# Patient Record
Sex: FEMALE | Race: BLACK OR AFRICAN AMERICAN | NOT HISPANIC OR LATINO | Employment: FULL TIME | URBAN - METROPOLITAN AREA
[De-identification: names, ages, dates, MRNs, and addresses within clinical notes are randomized per-mention and may not be internally consistent; named-entity substitution may affect disease eponyms.]

---

## 2020-11-27 ENCOUNTER — LAB (OUTPATIENT)
Dept: LAB | Age: 45
End: 2020-11-27

## 2020-11-27 ENCOUNTER — TRANSCRIBE ORDERS (OUTPATIENT)
Dept: ADMINISTRATIVE | Age: 45
End: 2020-11-27

## 2020-11-27 DIAGNOSIS — Z01.84 IMMUNITY STATUS TESTING: ICD-10-CM

## 2020-11-27 DIAGNOSIS — Z01.84 IMMUNITY STATUS TESTING: Primary | ICD-10-CM

## 2020-11-27 PROCEDURE — 86762 RUBELLA ANTIBODY: CPT

## 2020-11-27 PROCEDURE — 86787 VARICELLA-ZOSTER ANTIBODY: CPT

## 2020-11-27 PROCEDURE — 86765 RUBEOLA ANTIBODY: CPT

## 2020-11-27 PROCEDURE — 86735 MUMPS ANTIBODY: CPT

## 2020-11-27 PROCEDURE — 86480 TB TEST CELL IMMUN MEASURE: CPT

## 2020-11-28 LAB — RUBV IGG SERPL IA-ACNC: 52.2 IU/ML

## 2020-11-30 LAB
GAMMA INTERFERON BACKGROUND BLD IA-ACNC: 0.02 IU/ML
M TB IFN-G BLD-IMP: NEGATIVE
M TB IFN-G CD4+ BCKGRND COR BLD-ACNC: 0 IU/ML
M TB IFN-G CD4+ BCKGRND COR BLD-ACNC: 0 IU/ML
MEV IGG SER QL: ABNORMAL
MITOGEN IGNF BCKGRD COR BLD-ACNC: >10 IU/ML
MUV IGG SER QL: NORMAL
VZV IGG SER IA-ACNC: NORMAL

## 2021-01-06 ENCOUNTER — TRANSCRIBE ORDERS (OUTPATIENT)
Dept: LAB | Facility: HOSPITAL | Age: 46
End: 2021-01-06

## 2021-01-06 ENCOUNTER — LAB (OUTPATIENT)
Dept: LAB | Facility: HOSPITAL | Age: 46
End: 2021-01-06

## 2021-01-06 DIAGNOSIS — Z01.84 IMMUNITY STATUS TESTING: Primary | ICD-10-CM

## 2021-01-06 DIAGNOSIS — Z01.84 IMMUNITY STATUS TESTING: ICD-10-CM

## 2021-01-06 PROCEDURE — 36415 COLL VENOUS BLD VENIPUNCTURE: CPT

## 2021-01-06 PROCEDURE — 86765 RUBEOLA ANTIBODY: CPT

## 2021-01-07 LAB — MEV IGG SER QL: ABNORMAL

## 2021-08-25 ENCOUNTER — IMMUNIZATIONS (OUTPATIENT)
Dept: FAMILY MEDICINE CLINIC | Facility: MEDICAL CENTER | Age: 46
End: 2021-08-25

## 2021-08-25 DIAGNOSIS — Z23 ENCOUNTER FOR IMMUNIZATION: Primary | ICD-10-CM

## 2021-08-25 PROCEDURE — 91300 SARSCOV2 VAC 30MCG/0.3ML IM: CPT

## 2021-09-15 ENCOUNTER — IMMUNIZATIONS (OUTPATIENT)
Dept: FAMILY MEDICINE CLINIC | Facility: MEDICAL CENTER | Age: 46
End: 2021-09-15

## 2021-09-15 DIAGNOSIS — Z23 ENCOUNTER FOR IMMUNIZATION: Primary | ICD-10-CM

## 2021-09-15 PROCEDURE — 91300 SARSCOV2 VAC 30MCG/0.3ML IM: CPT

## 2022-01-13 ENCOUNTER — ANNUAL EXAM (OUTPATIENT)
Dept: OBGYN CLINIC | Facility: CLINIC | Age: 47
End: 2022-01-13
Payer: COMMERCIAL

## 2022-01-13 VITALS
BODY MASS INDEX: 38.72 KG/M2 | WEIGHT: 226.8 LBS | HEIGHT: 64 IN | DIASTOLIC BLOOD PRESSURE: 82 MMHG | SYSTOLIC BLOOD PRESSURE: 128 MMHG

## 2022-01-13 DIAGNOSIS — E04.1 THYROID NODULE: ICD-10-CM

## 2022-01-13 DIAGNOSIS — R92.8 ABNORMAL MAMMOGRAM: ICD-10-CM

## 2022-01-13 DIAGNOSIS — Z12.4 CERVICAL CANCER SCREENING: Primary | ICD-10-CM

## 2022-01-13 DIAGNOSIS — N81.4 CERVICAL PROLAPSE: ICD-10-CM

## 2022-01-13 DIAGNOSIS — Z12.31 ENCOUNTER FOR SCREENING MAMMOGRAM FOR BREAST CANCER: ICD-10-CM

## 2022-01-13 DIAGNOSIS — Z11.51 SCREENING FOR HPV (HUMAN PAPILLOMAVIRUS): ICD-10-CM

## 2022-01-13 DIAGNOSIS — Z01.419 ENCOUNTER FOR ANNUAL ROUTINE GYNECOLOGICAL EXAMINATION: ICD-10-CM

## 2022-01-13 PROCEDURE — G0476 HPV COMBO ASSAY CA SCREEN: HCPCS | Performed by: OBSTETRICS & GYNECOLOGY

## 2022-01-13 PROCEDURE — G0143 SCR C/V CYTO,THINLAYER,RESCR: HCPCS | Performed by: OBSTETRICS & GYNECOLOGY

## 2022-01-13 PROCEDURE — 99386 PREV VISIT NEW AGE 40-64: CPT | Performed by: OBSTETRICS & GYNECOLOGY

## 2022-01-13 NOTE — PROGRESS NOTES
Assessment/Plan:    Pap and HPV done today      I reviewed her most recent mammogram report  She was due to have a right diagnostic mammogram and ultrasound in 2020  I ordered a bilateral diagnostic mammogram and a right ultrasound  I explained that she will have to obtain her old records so that the radiologist can compare her new study to the old once  She understands this  Discussed self breast exams    colon cancer screening  -Reviewed the new guidelines    Thyroid nodule - this is a very movable, nontender nodule, it is directly over the right side of her thyroid but does not feel attached to her thyroid  I ordered a thyroid ultrasound and a TSH  She is aware she may need further follow-up  I recommended that she look into establishing herself with a new family doctor  Cervical prolapse- this is mild and asymptomatic, she is doing Kegel's and I recommended that she continue with this  She will call if symptoms change  discussed preventive care, regular exercise and a healthy diet      No problem-specific Assessment & Plan notes found for this encounter  Diagnoses and all orders for this visit:    Cervical cancer screening  -     Liquid-based pap, screening    Encounter for annual routine gynecological examination  -     Liquid-based pap, screening    Encounter for screening mammogram for breast cancer  -     Cancel: Mammo screening bilateral w 3d & cad; Future    Abnormal mammogram  -     Mammo diagnostic bilateral w 3d & cad; Future  -     US breast right limited (diagnostic); Future    Thyroid nodule  -     US thyroid; Future  -     TSH, 3rd generation with Free T4 reflex; Future    Screening for HPV (human papillomavirus)  -     Liquid-based pap, screening          Subjective:      Patient ID: Uzair Ortega is a 55 y o  female  New patient- 58-year-old female presents for yearly  Her last exam was about 1 year ago  She moved to the area in December 2020   She is concerned because she is overdue for a mammogram   Her last mammogram was in December 2019 and she was due to return in 6 months for a right breast US  And diagnostic mammogram   There was also a left breast asymmetry that resolved  She is overdue for this  She has dense tissue  she has had regular gyn care, no history of abnormal Paps  She is status post supracervical hysterectomy for very large fibroids  Tubes and ovaries were retained  Recently, she was told she had a cervical prolapse although this is not bothersome to her  The following portions of the patient's history were reviewed and updated as appropriate: allergies, current medications, past family history, past medical history, past social history, past surgical history and problem list     Review of Systems   Constitutional: Negative  Gastrointestinal: Negative  Genitourinary: Negative  Objective:      /82 (BP Location: Left arm, Patient Position: Sitting, Cuff Size: Standard)   Ht 5' 4" (1 626 m)   Wt 103 kg (226 lb 12 8 oz)   BMI 38 93 kg/m²          Physical Exam  Vitals reviewed  Constitutional:       Appearance: She is well-developed  Neck:      Thyroid: Thyroid mass present  No thyromegaly  Trachea: No tracheal deviation  Comments:  Very movable mass over the right side of her thyroid, nontender, approximately 1 5-2 centimeters  Cardiovascular:      Rate and Rhythm: Normal rate and regular rhythm  Pulmonary:      Effort: Pulmonary effort is normal       Breath sounds: Normal breath sounds  Chest:   Breasts: Breasts are symmetrical       Right: No inverted nipple, mass, nipple discharge, skin change or tenderness  Left: No inverted nipple, mass, nipple discharge, skin change or tenderness  Abdominal:      General: There is no distension  Palpations: Abdomen is soft  There is no mass  Tenderness: There is no abdominal tenderness     Genitourinary:     Labia:         Right: No rash, tenderness, lesion or injury  Left: No rash, tenderness, lesion or injury  Vagina: Normal       Cervix: No cervical motion tenderness, discharge or friability  Uterus: Absent  Adnexa:         Right: No mass, tenderness or fullness  Left: No mass, tenderness or fullness          Rectum: Normal       Comments:   Cervix normal, some cervical prolapse, uterus is surgically absent

## 2022-01-15 LAB
HPV HR 12 DNA CVX QL NAA+PROBE: NEGATIVE
HPV16 DNA CVX QL NAA+PROBE: NEGATIVE
HPV18 DNA CVX QL NAA+PROBE: NEGATIVE

## 2022-01-17 ENCOUNTER — HOSPITAL ENCOUNTER (OUTPATIENT)
Dept: ULTRASOUND IMAGING | Facility: CLINIC | Age: 47
Discharge: HOME | End: 2022-01-17
Payer: COMMERCIAL

## 2022-01-17 ENCOUNTER — HOSPITAL ENCOUNTER (OUTPATIENT)
Dept: MAMMOGRAPHY | Facility: CLINIC | Age: 47
Discharge: HOME | End: 2022-01-17
Payer: COMMERCIAL

## 2022-01-17 VITALS — HEIGHT: 64 IN | BODY MASS INDEX: 38.58 KG/M2 | WEIGHT: 226 LBS

## 2022-01-17 DIAGNOSIS — R92.8 ABNORMAL MAMMOGRAM: ICD-10-CM

## 2022-01-17 PROCEDURE — 76642 ULTRASOUND BREAST LIMITED: CPT

## 2022-01-17 PROCEDURE — 77066 DX MAMMO INCL CAD BI: CPT

## 2022-01-17 PROCEDURE — G0279 TOMOSYNTHESIS, MAMMO: HCPCS

## 2022-01-18 LAB
LAB AP GYN PRIMARY INTERPRETATION: NORMAL
Lab: NORMAL

## 2022-01-22 ENCOUNTER — HOSPITAL ENCOUNTER (OUTPATIENT)
Dept: ULTRASOUND IMAGING | Facility: HOSPITAL | Age: 47
Discharge: HOME | End: 2022-01-22
Payer: COMMERCIAL

## 2022-01-22 DIAGNOSIS — E04.1 THYROID NODULE: ICD-10-CM

## 2022-01-22 PROCEDURE — 76536 US EXAM OF HEAD AND NECK: CPT

## 2022-01-27 ENCOUNTER — TELEPHONE (OUTPATIENT)
Dept: MAMMOGRAPHY | Facility: CLINIC | Age: 47
End: 2022-01-27

## 2022-02-10 NOTE — NURSING NOTE
Call placed to patient to discuss upcoming appointment at [facility] radiology department and complete consultation with patient  Patient is having thyroid biopsy  utilizing US guidance  Reviewed patient's allergies, no current anticoagulant medication present, also discussed the pre and post procedure expectations  Reminded patient of location and time expected for procedure, Patient expressed understanding by verbalizing and repeating instructions

## 2022-02-22 ENCOUNTER — HOSPITAL ENCOUNTER (OUTPATIENT)
Dept: RADIOLOGY | Facility: HOSPITAL | Age: 47
Discharge: HOME | End: 2022-02-22
Attending: OTOLARYNGOLOGY
Payer: COMMERCIAL

## 2022-02-22 DIAGNOSIS — E04.2 MULTIPLE THYROID NODULES: ICD-10-CM

## 2022-02-22 PROCEDURE — 88173 CYTOPATH EVAL FNA REPORT: CPT | Performed by: PATHOLOGY

## 2022-02-22 PROCEDURE — 10006 FNA BX W/US GDN EA ADDL: CPT

## 2022-02-22 PROCEDURE — 10005 FNA BX W/US GDN 1ST LES: CPT

## 2022-02-22 RX ORDER — LIDOCAINE HYDROCHLORIDE 10 MG/ML
3 INJECTION, SOLUTION EPIDURAL; INFILTRATION; INTRACAUDAL; PERINEURAL ONCE
Status: COMPLETED | OUTPATIENT
Start: 2022-02-22 | End: 2022-02-22

## 2022-02-22 RX ADMIN — LIDOCAINE HYDROCHLORIDE 3 ML: 10 INJECTION, SOLUTION EPIDURAL; INFILTRATION; INTRACAUDAL; PERINEURAL at 11:15

## 2022-07-14 ENCOUNTER — APPOINTMENT (OUTPATIENT)
Dept: LAB | Facility: HOSPITAL | Age: 47
End: 2022-07-14

## 2022-07-14 DIAGNOSIS — Z00.8 HEALTH EXAMINATION IN POPULATION SURVEY: ICD-10-CM

## 2022-07-14 LAB
CHOLEST SERPL-MCNC: 180 MG/DL
EST. AVERAGE GLUCOSE BLD GHB EST-MCNC: 120 MG/DL
HBA1C MFR BLD: 5.8 %
HDLC SERPL-MCNC: 46 MG/DL
LDLC SERPL CALC-MCNC: 124 MG/DL (ref 0–100)
NONHDLC SERPL-MCNC: 134 MG/DL
TRIGL SERPL-MCNC: 48 MG/DL

## 2022-07-14 PROCEDURE — 36415 COLL VENOUS BLD VENIPUNCTURE: CPT

## 2022-07-14 PROCEDURE — 80061 LIPID PANEL: CPT

## 2022-07-14 PROCEDURE — 83036 HEMOGLOBIN GLYCOSYLATED A1C: CPT

## 2022-07-19 ENCOUNTER — HOSPITAL ENCOUNTER (EMERGENCY)
Facility: HOSPITAL | Age: 47
Discharge: HOME | End: 2022-07-19
Attending: EMERGENCY MEDICINE
Payer: OTHER MISCELLANEOUS

## 2022-07-19 VITALS
SYSTOLIC BLOOD PRESSURE: 137 MMHG | HEART RATE: 92 BPM | DIASTOLIC BLOOD PRESSURE: 78 MMHG | RESPIRATION RATE: 16 BRPM | TEMPERATURE: 98.1 F | OXYGEN SATURATION: 99 %

## 2022-07-19 DIAGNOSIS — M54.30 SCIATIC LEG PAIN: Primary | ICD-10-CM

## 2022-07-19 PROCEDURE — 99283 EMERGENCY DEPT VISIT LOW MDM: CPT

## 2022-07-19 PROCEDURE — 99284 EMERGENCY DEPT VISIT MOD MDM: CPT | Performed by: EMERGENCY MEDICINE

## 2022-07-19 RX ORDER — CYCLOBENZAPRINE HCL 10 MG
10 TABLET ORAL 2 TIMES DAILY PRN
Qty: 20 TABLET | Refills: 0 | Status: SHIPPED | OUTPATIENT
Start: 2022-07-19 | End: 2022-07-19 | Stop reason: CLARIF

## 2022-07-19 RX ORDER — LIDOCAINE 50 MG/G
1 PATCH TOPICAL DAILY
Qty: 5 PATCH | Refills: 0 | Status: SHIPPED | OUTPATIENT
Start: 2022-07-19 | End: 2022-07-24

## 2022-07-19 RX ORDER — PREDNISONE 20 MG/1
TABLET ORAL
Qty: 5 TABLET | Refills: 0 | Status: SHIPPED | OUTPATIENT
Start: 2022-07-19 | End: 2022-07-23

## 2022-07-19 RX ORDER — DIAZEPAM 5 MG/1
5 TABLET ORAL ONCE
Status: DISCONTINUED | OUTPATIENT
Start: 2022-07-19 | End: 2022-07-19 | Stop reason: HOSPADM

## 2022-07-19 RX ORDER — PREDNISONE 20 MG/1
40 TABLET ORAL ONCE
Status: COMPLETED | OUTPATIENT
Start: 2022-07-19 | End: 2022-07-19

## 2022-07-19 RX ORDER — LIDOCAINE 50 MG/G
1 PATCH TOPICAL ONCE
Status: DISCONTINUED | OUTPATIENT
Start: 2022-07-19 | End: 2022-07-19 | Stop reason: HOSPADM

## 2022-07-19 RX ORDER — DIAZEPAM 5 MG/1
5 TABLET ORAL EVERY 8 HOURS PRN
Qty: 15 TABLET | Refills: 0 | Status: SHIPPED | OUTPATIENT
Start: 2022-07-19

## 2022-07-19 RX ADMIN — PREDNISONE 40 MG: 20 TABLET ORAL at 10:31

## 2022-07-19 RX ADMIN — LIDOCAINE 5% 1 PATCH: 700 PATCH TOPICAL at 10:31

## 2022-07-19 NOTE — Clinical Note
Daniella Martinez was seen and treated in our emergency department on 7/19/2022  Diagnosis:     Dilia Hernández    She may return on this date: 07/21/2022         If you have any questions or concerns, please don't hesitate to call        Mireya Rajput DO    ______________________________           _______________          _______________  Hospital Representative                              Date                                Time

## 2022-07-19 NOTE — ED PROVIDER NOTES
History  Chief Complaint   Patient presents with    Back Pain     While at work, bent over to pick something up and felt pain in lower back going down right leg  Hx of sciatica      Patient is a 42-year-old female presented to the emergency department for chief complaint of lower back pain  Patient states that on Sunday of this week she bent over to pick something up and felt a pain in her lower back on the left side  Patient states that since she has been treating symptomatically with over-the-counter medications without any alleviation of her symptoms  Patient states that today she tweaked it again and has been getting a shooting pain down the back of her left buttocks into her thigh  Patient states it feels exactly like her sciatic pain that she has had prior 1 to come to the hospital for further evaluation  Patient denies any fevers, chills, chest pain, shortness breath, nausea, vomiting, diarrhea, constipation, abdominal pain, dysuria, hematuria, urinary incontinence, saddle anesthesias, difficulty ambulating  None       Past Medical History:   Diagnosis Date    Fibroid     Varicella        Past Surgical History:   Procedure Laterality Date    CARDIAC ELECTROPHYSIOLOGY STUDY AND ABLATION      7/2015    PARTIAL HYSTERECTOMY      2/2012    US GUIDED THYROID BIOPSY  2/22/2022       Family History   Problem Relation Age of Onset    No Known Problems Mother     Heart disease Father     Diabetes Father     Hyperlipidemia Father     Hypertension Father     No Known Problems Sister     No Known Problems Daughter     No Known Problems Maternal Grandmother     No Known Problems Maternal Grandfather     No Known Problems Paternal Grandmother     No Known Problems Paternal Grandfather     No Known Problems Maternal Aunt     No Known Problems Paternal Aunt     Stomach cancer Paternal Aunt      I have reviewed and agree with the history as documented      E-Cigarette/Vaping    E-Cigarette Use Never User      E-Cigarette/Vaping Substances    Nicotine No     THC No     CBD No     Flavoring No      Social History     Tobacco Use    Smoking status: Never Smoker    Smokeless tobacco: Never Used   Vaping Use    Vaping Use: Never used   Substance Use Topics    Alcohol use: Yes     Comment: social    Drug use: Never        Review of Systems   Constitutional: Positive for activity change  Negative for chills and fever  HENT: Negative for congestion, ear pain and sore throat  Eyes: Negative for pain and visual disturbance  Respiratory: Negative for cough, chest tightness and shortness of breath  Cardiovascular: Negative for chest pain and palpitations  Gastrointestinal: Negative for abdominal pain, constipation, diarrhea, nausea and vomiting  Genitourinary: Negative for decreased urine volume, difficulty urinating, dysuria and hematuria  Musculoskeletal: Positive for back pain  Negative for arthralgias, neck pain and neck stiffness  Skin: Negative for color change and rash  Neurological: Negative for dizziness, seizures, syncope, weakness, light-headedness, numbness and headaches  Psychiatric/Behavioral: Negative for agitation and behavioral problems  All other systems reviewed and are negative  Physical Exam  ED Triage Vitals [07/19/22 1026]   Temperature Pulse Respirations Blood Pressure SpO2   98 1 °F (36 7 °C) 92 16 137/78 99 %      Temp Source Heart Rate Source Patient Position - Orthostatic VS BP Location FiO2 (%)   Oral Monitor Sitting Right arm --      Pain Score       8             Orthostatic Vital Signs  Vitals:    07/19/22 1026   BP: 137/78   Pulse: 92   Patient Position - Orthostatic VS: Sitting       Physical Exam  Vitals and nursing note reviewed  Constitutional:       General: She is not in acute distress  Appearance: Normal appearance  She is well-developed and normal weight  HENT:      Head: Normocephalic and atraumatic        Right Ear: External ear normal       Left Ear: External ear normal       Nose: Nose normal       Mouth/Throat:      Mouth: Mucous membranes are moist    Eyes:      Extraocular Movements: Extraocular movements intact  Conjunctiva/sclera: Conjunctivae normal       Pupils: Pupils are equal, round, and reactive to light  Cardiovascular:      Rate and Rhythm: Normal rate and regular rhythm  Heart sounds: No murmur heard  Pulmonary:      Effort: Pulmonary effort is normal  No respiratory distress  Breath sounds: Normal breath sounds  Abdominal:      General: Abdomen is flat  Palpations: Abdomen is soft  Tenderness: There is no abdominal tenderness  There is no guarding or rebound  Musculoskeletal:      Cervical back: Normal, normal range of motion and neck supple  Thoracic back: Normal       Lumbar back: No bony tenderness  Positive left straight leg raise test       Right hip: Normal       Left hip: Normal       Right upper leg: Normal       Left upper leg: Normal       Comments: Paraspinal hypertonicity on the left side of the lumbar spine   Skin:     General: Skin is warm and dry  Capillary Refill: Capillary refill takes less than 2 seconds  Neurological:      General: No focal deficit present  Mental Status: She is alert and oriented to person, place, and time     Psychiatric:         Mood and Affect: Mood normal          Behavior: Behavior normal          ED Medications  Medications   diazepam (VALIUM) tablet 5 mg (5 mg Oral Not Given 7/19/22 1034)   lidocaine (LIDODERM) 5 % patch 1 patch (1 patch Topical Medication Applied 7/19/22 1031)   predniSONE tablet 40 mg (40 mg Oral Given 7/19/22 1031)       Diagnostic Studies  Results Reviewed     None                 No orders to display         Procedures  Procedures      ED Course  ED Course as of 07/19/22 1045   Tue Jul 19, 2022   1031 Blood Pressure: 137/78   1031 Temperature: 98 1 °F (36 7 °C)   1031 Temp Source: Oral   1031 Pulse: 92 1031 Respirations: 16   1031 SpO2: 99 %   1038 Patient is 78-year-old female presenting to the emergency department for evaluation of sciatic pain  Will treat patient's symptoms with steroids, Valium as well as Lidoderm patches  Advised patient to follow-up with her primary care doctor in a few days for re-evaluation  Advised her to come back to the hospital if she develops any new, worsening or concerning symptoms  Advised her to follow-up with comprehensive spine for further evaluation  Patient understands has no questions or concerns at this time stable for discharge  SBIRT 22yo+    Flowsheet Row Most Recent Value   SBIRT (25 yo +)    In order to provide better care to our patients, we are screening all of our patients for alcohol and drug use  Would it be okay to ask you these screening questions? Unable to answer at this time Filed at: 07/19/2022 1028                MDM    Disposition  Final diagnoses:   Sciatic leg pain     Time reflects when diagnosis was documented in both MDM as applicable and the Disposition within this note     Time User Action Codes Description Comment    7/19/2022 10:24 AM Cruz Briones Add [M54 30] Sciatic leg pain       ED Disposition     ED Disposition   Discharge    Condition   Stable    Date/Time   Tue Jul 19, 2022 10:34 AM    Comment   Gloria Mercer discharge to home/self care                 Follow-up Information     Follow up With Specialties Details Why Contact Info Additional [address] [facility] Emergency Medicine Go to  As needed, If symptoms worsen [address] [phone]  [address] Emergency Department, [address] [place], [place], [address] [place] [address] Physical Therapy Schedule an appointment as soon as possible for a visit  for follow up 11 [phone] [facility] Family Medicine Schedule an appointment as soon as possible for a visit  for follow up [address], [address] [facility] [phone]  [address], [address], [address] [place], [place], [address]          Patient's Medications   Discharge Prescriptions    DIAZEPAM (VALIUM) 5 MG TABLET    Take 1 tablet (5 mg total) by mouth every 8 (eight) hours as needed for muscle spasms for up to 15 doses       Start Date: 7/19/2022 End Date: --       Order Dose: 5 mg       Quantity: 15 tablet    Refills: 0    LIDOCAINE (LIDODERM) 5 %    Apply 1 patch topically daily for 5 days Remove & Discard patch within 12 hours or as directed by MD       Start Date: 7/19/2022 End Date: 7/24/2022       Order Dose: 1 patch       Quantity: 5 patch    Refills: 0    PREDNISONE 20 MG TABLET    Take 2 tablets (40 mg total) by mouth daily for 1 day, THEN 1 5 tablets (30 mg total) daily for 1 day, THEN 1 tablet (20 mg total) daily for 1 day, THEN 0 5 tablets (10 mg total) daily for 1 day  Start Date: 7/19/2022 End Date: 7/23/2022       Order Dose: --       Quantity: 5 tablet    Refills: 0     No discharge procedures on file  PDMP Review     None           ED Provider  Attending physically available and evaluated Jaren Harvey I managed the patient along with the ED Attending      Electronically Signed by         Shanon Hickman DO  07/19/22 5234

## 2022-08-06 NOTE — ED ATTENDING ATTESTATION
7/19/2022  Ave URIAS MD, saw and evaluated the patient  I have discussed the patient with the resident/non-physician practitioner and agree with the resident's/non-physician practitioner's findings, Plan of Care, and MDM as documented in the resident's/non-physician practitioner's note, except where noted  All available labs and Radiology studies were reviewed  I was present for key portions of any procedure(s) performed by the resident/non-physician practitioner and I was immediately available to provide assistance  At this point I agree with the current assessment done in the Emergency Department  I have conducted an independent evaluation of this patient a history and physical is as follows:    ED Course     Patient presents for evaluation due to left lower back pain radiating down her left leg that started on Sunday when she bent over to pick something up  Patient states that today the pain was worse  Patient has had sciatic pain and 1 time previously  No improvement with over-the-counter medications  She denies any numbness, weakness, loss of bowel/bladder control, or fevers  No additional complaints  Exam: AAOx3, moderate distress due to pain, reproduction of symptoms with palpation in left piriformis, no motor/sensory deficits  A/P:  Sciatica  Will treat with steroids, Lidoderm, and muscle relaxer      Critical Care Time  Procedures

## 2022-09-01 ENCOUNTER — APPOINTMENT (OUTPATIENT)
Dept: RADIOLOGY | Facility: HOSPITAL | Age: 47
End: 2022-09-01
Payer: COMMERCIAL

## 2022-09-01 ENCOUNTER — HOSPITAL ENCOUNTER (EMERGENCY)
Facility: HOSPITAL | Age: 47
Discharge: HOME | End: 2022-09-01
Attending: EMERGENCY MEDICINE
Payer: COMMERCIAL

## 2022-09-01 VITALS
HEIGHT: 64 IN | HEART RATE: 89 BPM | DIASTOLIC BLOOD PRESSURE: 96 MMHG | OXYGEN SATURATION: 98 % | RESPIRATION RATE: 18 BRPM | TEMPERATURE: 98.3 F | BODY MASS INDEX: 38.58 KG/M2 | SYSTOLIC BLOOD PRESSURE: 165 MMHG | WEIGHT: 226 LBS

## 2022-09-01 DIAGNOSIS — K52.9 GASTROENTERITIS: Primary | ICD-10-CM

## 2022-09-01 LAB
ALBUMIN SERPL BCP-MCNC: 3.8 G/DL (ref 3.5–5)
ALP SERPL-CCNC: 73 U/L (ref 46–116)
ALT SERPL W P-5'-P-CCNC: 24 U/L (ref 12–78)
ANION GAP SERPL CALCULATED.3IONS-SCNC: 9 MMOL/L (ref 4–13)
AST SERPL W P-5'-P-CCNC: 12 U/L (ref 5–45)
ATRIAL RATE: 81 BPM
BASOPHILS # BLD AUTO: 0.03 THOUSANDS/ΜL (ref 0–0.1)
BASOPHILS NFR BLD AUTO: 0 % (ref 0–1)
BILIRUB SERPL-MCNC: 0.5 MG/DL (ref 0.2–1)
BUN SERPL-MCNC: 10 MG/DL (ref 5–25)
CALCIUM SERPL-MCNC: 8.7 MG/DL (ref 8.3–10.1)
CARDIAC TROPONIN I PNL SERPL HS: 3 NG/L
CHLORIDE SERPL-SCNC: 106 MMOL/L (ref 96–108)
CO2 SERPL-SCNC: 26 MMOL/L (ref 21–32)
CREAT SERPL-MCNC: 0.92 MG/DL (ref 0.6–1.3)
EOSINOPHIL # BLD AUTO: 0.06 THOUSAND/ΜL (ref 0–0.61)
EOSINOPHIL NFR BLD AUTO: 1 % (ref 0–6)
ERYTHROCYTE [DISTWIDTH] IN BLOOD BY AUTOMATED COUNT: 14.6 % (ref 11.6–15.1)
GFR SERPL CREATININE-BSD FRML MDRD: 74 ML/MIN/1.73SQ M
GLUCOSE SERPL-MCNC: 117 MG/DL (ref 65–140)
HCT VFR BLD AUTO: 42.3 % (ref 34.8–46.1)
HGB BLD-MCNC: 13.4 G/DL (ref 11.5–15.4)
IMM GRANULOCYTES # BLD AUTO: 0.02 THOUSAND/UL (ref 0–0.2)
IMM GRANULOCYTES NFR BLD AUTO: 0 % (ref 0–2)
LIPASE SERPL-CCNC: 49 U/L (ref 73–393)
LYMPHOCYTES # BLD AUTO: 1.35 THOUSANDS/ΜL (ref 0.6–4.47)
LYMPHOCYTES NFR BLD AUTO: 15 % (ref 14–44)
MCH RBC QN AUTO: 26.3 PG (ref 26.8–34.3)
MCHC RBC AUTO-ENTMCNC: 31.7 G/DL (ref 31.4–37.4)
MCV RBC AUTO: 83 FL (ref 82–98)
MONOCYTES # BLD AUTO: 0.27 THOUSAND/ΜL (ref 0.17–1.22)
MONOCYTES NFR BLD AUTO: 3 % (ref 4–12)
NEUTROPHILS # BLD AUTO: 7.36 THOUSANDS/ΜL (ref 1.85–7.62)
NEUTS SEG NFR BLD AUTO: 81 % (ref 43–75)
NRBC BLD AUTO-RTO: 0 /100 WBCS
P AXIS: 58 DEGREES
PLATELET # BLD AUTO: 299 THOUSANDS/UL (ref 149–390)
PMV BLD AUTO: 9 FL (ref 8.9–12.7)
POTASSIUM SERPL-SCNC: 3.6 MMOL/L (ref 3.5–5.3)
PR INTERVAL: 136 MS
PROT SERPL-MCNC: 7.6 G/DL (ref 6.4–8.4)
QRS AXIS: 24 DEGREES
QRSD INTERVAL: 76 MS
QT INTERVAL: 386 MS
QTC INTERVAL: 448 MS
RBC # BLD AUTO: 5.09 MILLION/UL (ref 3.81–5.12)
SODIUM SERPL-SCNC: 141 MMOL/L (ref 135–147)
T WAVE AXIS: 19 DEGREES
VENTRICULAR RATE: 81 BPM
WBC # BLD AUTO: 9.09 THOUSAND/UL (ref 4.31–10.16)

## 2022-09-01 PROCEDURE — 36415 COLL VENOUS BLD VENIPUNCTURE: CPT

## 2022-09-01 PROCEDURE — 85025 COMPLETE CBC W/AUTO DIFF WBC: CPT | Performed by: EMERGENCY MEDICINE

## 2022-09-01 PROCEDURE — 99285 EMERGENCY DEPT VISIT HI MDM: CPT | Performed by: PHYSICIAN ASSISTANT

## 2022-09-01 PROCEDURE — 93010 ELECTROCARDIOGRAM REPORT: CPT | Performed by: INTERNAL MEDICINE

## 2022-09-01 PROCEDURE — 71046 X-RAY EXAM CHEST 2 VIEWS: CPT

## 2022-09-01 PROCEDURE — 99284 EMERGENCY DEPT VISIT MOD MDM: CPT

## 2022-09-01 PROCEDURE — 83690 ASSAY OF LIPASE: CPT | Performed by: PHYSICIAN ASSISTANT

## 2022-09-01 PROCEDURE — 80053 COMPREHEN METABOLIC PANEL: CPT | Performed by: EMERGENCY MEDICINE

## 2022-09-01 PROCEDURE — 84484 ASSAY OF TROPONIN QUANT: CPT | Performed by: EMERGENCY MEDICINE

## 2022-09-01 PROCEDURE — 96361 HYDRATE IV INFUSION ADD-ON: CPT

## 2022-09-01 PROCEDURE — 96374 THER/PROPH/DIAG INJ IV PUSH: CPT

## 2022-09-01 PROCEDURE — 93005 ELECTROCARDIOGRAM TRACING: CPT

## 2022-09-01 RX ORDER — MAGNESIUM HYDROXIDE/ALUMINUM HYDROXICE/SIMETHICONE 120; 1200; 1200 MG/30ML; MG/30ML; MG/30ML
30 SUSPENSION ORAL ONCE
Status: COMPLETED | OUTPATIENT
Start: 2022-09-01 | End: 2022-09-01

## 2022-09-01 RX ORDER — ONDANSETRON 2 MG/ML
4 INJECTION INTRAMUSCULAR; INTRAVENOUS ONCE
Status: COMPLETED | OUTPATIENT
Start: 2022-09-01 | End: 2022-09-01

## 2022-09-01 RX ORDER — LIDOCAINE HYDROCHLORIDE 20 MG/ML
15 SOLUTION OROPHARYNGEAL ONCE
Status: COMPLETED | OUTPATIENT
Start: 2022-09-01 | End: 2022-09-01

## 2022-09-01 RX ADMIN — ALUMINUM HYDROXIDE, MAGNESIUM HYDROXIDE, AND SIMETHICONE 30 ML: 200; 200; 20 SUSPENSION ORAL at 12:55

## 2022-09-01 RX ADMIN — ONDANSETRON 4 MG: 2 INJECTION INTRAMUSCULAR; INTRAVENOUS at 11:11

## 2022-09-01 RX ADMIN — LIDOCAINE HYDROCHLORIDE 15 ML: 20 SOLUTION ORAL; TOPICAL at 12:55

## 2022-09-01 RX ADMIN — SODIUM CHLORIDE 1000 ML: 0.9 INJECTION, SOLUTION INTRAVENOUS at 11:18

## 2022-09-01 NOTE — Clinical Note
Melinda Fifi was seen and treated in our emergency department on 9/1/2022  Diagnosis:     Jamaal Asher  is off the rest of the shift today  She may return on this date: 09/05/2022         If you have any questions or concerns, please don't hesitate to call        Bartolo Pascual PA-C    ______________________________           _______________          _______________  Hospital Representative                              Date                                Time

## 2022-09-01 NOTE — ED PROVIDER NOTES
History  Chief Complaint   Patient presents with    Dizziness     Pt to er with complaints of dizziness associated with vomiting that started around 830pm after dinner  Vomited again this am and upon arrival to ER      42-year-old female no significant past medical history, some anxiety, past surgical history of hysterectomy presents after having diarrhea and vomiting associated with some lightheadedness starting last made proximally dinner time  She reports she vomited approximately 10 times and diarrhea maybe half as much  No bloody stools or mucoid stools  No fevers  No exquisite abdominal pain but states she is having waves of pain that come and go in her epigastrium  Prior to Admission Medications   Prescriptions Last Dose Informant Patient Reported?  Taking?   diazepam (VALIUM) 5 mg tablet   No No   Sig: Take 1 tablet (5 mg total) by mouth every 8 (eight) hours as needed for muscle spasms for up to 15 doses   lidocaine (Lidoderm) 5 %   No No   Sig: Apply 1 patch topically daily for 5 days Remove & Discard patch within 12 hours or as directed by MD      Facility-Administered Medications: None       Past Medical History:   Diagnosis Date    Fibroid     Varicella        Past Surgical History:   Procedure Laterality Date    CARDIAC ELECTROPHYSIOLOGY STUDY AND ABLATION      7/2015    PARTIAL HYSTERECTOMY      2/2012    US GUIDED THYROID BIOPSY  2/22/2022       Family History   Problem Relation Age of Onset    No Known Problems Mother     Heart disease Father     Diabetes Father     Hyperlipidemia Father     Hypertension Father     No Known Problems Sister     No Known Problems Daughter     No Known Problems Maternal Grandmother     No Known Problems Maternal Grandfather     No Known Problems Paternal Grandmother     No Known Problems Paternal Grandfather     No Known Problems Maternal Aunt     No Known Problems Paternal Aunt     Stomach cancer Paternal Aunt      I have reviewed and agree with the history as documented  E-Cigarette/Vaping    E-Cigarette Use Never User      E-Cigarette/Vaping Substances    Nicotine No     THC No     CBD No     Flavoring No      Social History     Tobacco Use    Smoking status: Never Smoker    Smokeless tobacco: Never Used   Vaping Use    Vaping Use: Never used   Substance Use Topics    Alcohol use: Yes     Comment: social    Drug use: Never       Review of Systems   Constitutional: Negative for chills and fever  HENT: Negative for ear pain and sore throat  Eyes: Negative for pain and visual disturbance  Respiratory: Negative for cough and shortness of breath  Cardiovascular: Negative for chest pain and palpitations  Gastrointestinal: Positive for abdominal pain (Epigastric), nausea and vomiting  Genitourinary: Negative for dysuria and hematuria  Musculoskeletal: Negative for arthralgias and back pain  Skin: Negative for color change and rash  Neurological: Negative for seizures and syncope  All other systems reviewed and are negative  Physical Exam  Physical Exam  Vitals and nursing note reviewed  Constitutional:       General: She is not in acute distress  Appearance: She is well-developed  HENT:      Head: Normocephalic and atraumatic  Eyes:      Conjunctiva/sclera: Conjunctivae normal    Cardiovascular:      Rate and Rhythm: Normal rate and regular rhythm  Heart sounds: No murmur heard  Pulmonary:      Effort: Pulmonary effort is normal  No respiratory distress  Breath sounds: Normal breath sounds  Abdominal:      General: There is no distension  Palpations: Abdomen is soft  Tenderness: There is abdominal tenderness in the epigastric area  There is no right CVA tenderness, left CVA tenderness, guarding or rebound  Negative signs include Elam's sign, Rovsing's sign, McBurney's sign, psoas sign and obturator sign        Comments: Very mild epigastric tenderness / subjective reproduction palpation   Musculoskeletal:      Cervical back: Neck supple  Skin:     General: Skin is warm and dry  Neurological:      Mental Status: She is alert           Vital Signs  ED Triage Vitals   Temperature Pulse Respirations Blood Pressure SpO2   09/01/22 1041 09/01/22 1039 09/01/22 1039 09/01/22 1039 09/01/22 1039   98 3 °F (36 8 °C) 89 18 165/96 98 %      Temp Source Heart Rate Source Patient Position - Orthostatic VS BP Location FiO2 (%)   09/01/22 1041 09/01/22 1039 09/01/22 1039 09/01/22 1039 --   Oral Monitor Sitting Left arm       Pain Score       --                  Vitals:    09/01/22 1039   BP: 165/96   Pulse: 89   Patient Position - Orthostatic VS: Sitting         Visual Acuity      ED Medications  Medications   sodium chloride 0 9 % bolus 1,000 mL (0 mL Intravenous Stopped 9/1/22 1253)   ondansetron (ZOFRAN) injection 4 mg (4 mg Intravenous Given 9/1/22 1111)   aluminum-magnesium hydroxide-simethicone (MYLANTA) oral suspension 30 mL (30 mL Oral Given 9/1/22 1255)   Lidocaine Viscous HCl (XYLOCAINE) 2 % mucosal solution 15 mL (15 mL Swish & Swallow Given 9/1/22 1255)       Diagnostic Studies  Results Reviewed     Procedure Component Value Units Date/Time    Lipase [[id number]]  (Abnormal) Collected: 09/01/22 1047    Lab Status: Final result Specimen: Blood from Arm, Left Updated: 09/01/22 1227     Lipase 49 u/L     HS Troponin 0hr (reflex protocol) [[id number]]  (Normal) Collected: 09/01/22 1047    Lab Status: Final result Specimen: Blood from Arm, Left Updated: 09/01/22 1124     hs TnI 0hr 3 ng/L     Comprehensive metabolic panel [[id number]] Collected: 09/01/22 1047    Lab Status: Final result Specimen: Blood from Arm, Left Updated: 09/01/22 1116     Sodium 141 mmol/L      Potassium 3 6 mmol/L      Chloride 106 mmol/L      CO2 26 mmol/L      ANION GAP 9 mmol/L      BUN 10 mg/dL      Creatinine 0 92 mg/dL      Glucose 117 mg/dL      Calcium 8 7 mg/dL      AST 12 U/L      ALT 24 U/L      Alkaline Phosphatase 73 U/L      Total Protein 7 6 g/dL      Albumin 3 8 g/dL      Total Bilirubin 0 50 mg/dL      eGFR 74 ml/min/1 73sq m     Narrative:      [place] guidelines for Chronic Kidney Disease (CKD):     Stage 1 with normal or high GFR (GFR > 90 mL/min/1 73 square meters)    Stage 2 Mild CKD (GFR = 60-89 mL/min/1 73 square meters)    Stage 3A Moderate CKD (GFR = 45-59 mL/min/1 73 square meters)    Stage 3B Moderate CKD (GFR = 30-44 mL/min/1 73 square meters)    Stage 4 Severe CKD (GFR = 15-29 mL/min/1 73 square meters)    Stage 5 End Stage CKD (GFR <15 mL/min/1 73 square meters)  Note: GFR calculation is accurate only with a steady state creatinine    CBC and differential [[id number]]  (Abnormal) Collected: 09/01/22 1047    Lab Status: Final result Specimen: Blood from Arm, Left Updated: 09/01/22 1059     WBC 9 09 Thousand/uL      RBC 5 09 Million/uL      Hemoglobin 13 4 g/dL      Hematocrit 42 3 %      MCV 83 fL      MCH 26 3 pg      MCHC 31 7 g/dL      RDW 14 6 %      MPV 9 0 fL      Platelets 458 Thousands/uL      nRBC 0 /100 WBCs      Neutrophils Relative 81 %      Immat GRANS % 0 %      Lymphocytes Relative 15 %      Monocytes Relative 3 %      Eosinophils Relative 1 %      Basophils Relative 0 %      Neutrophils Absolute 7 36 Thousands/µL      Immature Grans Absolute 0 02 Thousand/uL      Lymphocytes Absolute 1 35 Thousands/µL      Monocytes Absolute 0 27 Thousand/µL      Eosinophils Absolute 0 06 Thousand/µL      Basophils Absolute 0 03 Thousands/µL                  XR chest pa & lateral   Final Result by Eugene Dorsey MD (09/01 1307)      No acute cardiopulmonary disease                    Workstation performed: [id number]                    Procedures  ECG 12 Lead Documentation Only    Date/Time: 9/1/2022 10:43 AM  Performed by: Hailey Ford PA-C  Authorized by: Hailey Ford PA-C     Indications / Diagnosis:  Dizziness vomiting  ECG reviewed by me, the ED Provider: no    Patient location:  ED  Previous ECG:     Previous ECG:  Unavailable  Interpretation:     Interpretation: normal    Rate:     ECG rate:  81    ECG rate assessment: normal    Rhythm:     Rhythm: sinus rhythm    Ectopy:     Ectopy: none    QRS:     QRS axis:  Normal  Conduction:     Conduction: normal    ST segments:     ST segments:  Normal  T waves:     T waves: normal               ED Course  ED Course as of 09/04/22 1124   u Sep 01, 2022   1059 Workup initiated, EKG ordered and reviewed which is normal   Zofran IV fluids ordered   1125 Patient feeling somewhat better  IV fluids running / s/p Zofran     1228 Discharge instructions provided  Patient markedly improved and resolved pain  Did advise home from work next 2 days for resolution of vomiting and diarrhea as well as prevention of contagion  Work note provided  Prescription for Zofran in the outpatient setting  Discussed likely self-limited course with return to emergency department precautions given if any worsening signs or symptoms  MDM  Number of Diagnoses or Management Options  Gastroenteritis: minor     Amount and/or Complexity of Data Reviewed  Clinical lab tests: ordered and reviewed  Tests in the radiology section of CPT®: ordered and reviewed        Disposition  Final diagnoses:   Gastroenteritis     Time reflects when diagnosis was documented in both MDM as applicable and the Disposition within this note     Time User Action Codes Description Comment    9/1/2022 12:20 PM Domenic Nieto Add [K52 9] Gastroenteritis       ED Disposition     ED Disposition   Discharge    Condition   Stable    Date/Time   Thu Sep 1, 2022 12:20 PM    Roxana Mancuso discharge to home/self care                 Follow-up Information     Follow up With Specialties Details Why Contact Info Additional Information    [employer]fol[employer]   Call to gain a  [address] [facility] Emergency Department Emergency Medicine Go to  If symptoms worsen [address] [place],[address] [address]  [phone] [address] Emergency Department, [address] [place], [address]          Discharge Medication List as of 9/1/2022 12:25 PM      START taking these medications    Details   aluminum-magnesium hydroxide 200-200 MG/5ML suspension Take 10 mL by mouth every 6 (six) hours as needed for cramping, indigestion or heartburn for up to 4 days, Starting Thu 9/1/2022, Until Mon 9/5/2022 at 2359, Normal         CONTINUE these medications which have NOT CHANGED    Details   diazepam (VALIUM) 5 mg tablet Take 1 tablet (5 mg total) by mouth every 8 (eight) hours as needed for muscle spasms for up to 15 doses, Starting Tue 7/19/2022, Normal      lidocaine (Lidoderm) 5 % Apply 1 patch topically daily for 5 days Remove & Discard patch within 12 hours or as directed by MD, Starting Tue 7/19/2022, Until Sun 7/24/2022, Normal             No discharge procedures on file      PDMP Review     None          ED Provider  Electronically Signed by           Jenna Grace PA-C  09/04/22 7641

## 2022-12-21 ENCOUNTER — HOSPITAL ENCOUNTER (EMERGENCY)
Facility: HOSPITAL | Age: 47
Discharge: HOME | End: 2022-12-21
Attending: EMERGENCY MEDICINE

## 2022-12-21 ENCOUNTER — APPOINTMENT (EMERGENCY)
Dept: RADIOLOGY | Facility: HOSPITAL | Age: 47
End: 2022-12-21

## 2022-12-21 VITALS
SYSTOLIC BLOOD PRESSURE: 182 MMHG | DIASTOLIC BLOOD PRESSURE: 105 MMHG | RESPIRATION RATE: 18 BRPM | HEART RATE: 59 BPM | WEIGHT: 226 LBS | BODY MASS INDEX: 38.79 KG/M2 | TEMPERATURE: 98.1 F | OXYGEN SATURATION: 99 %

## 2022-12-21 DIAGNOSIS — M25.511 ACUTE PAIN OF RIGHT SHOULDER: ICD-10-CM

## 2022-12-21 DIAGNOSIS — S49.91XA INJURY OF RIGHT SHOULDER, INITIAL ENCOUNTER: Primary | ICD-10-CM

## 2022-12-21 RX ORDER — IBUPROFEN 400 MG/1
400 TABLET ORAL ONCE
Status: COMPLETED | OUTPATIENT
Start: 2022-12-21 | End: 2022-12-21

## 2022-12-21 RX ORDER — NAPROXEN 500 MG/1
500 TABLET ORAL 2 TIMES DAILY WITH MEALS
Qty: 14 TABLET | Refills: 0 | Status: SHIPPED | OUTPATIENT
Start: 2022-12-21 | End: 2022-12-28

## 2022-12-21 RX ADMIN — IBUPROFEN 400 MG: 400 TABLET ORAL at 20:36

## 2022-12-21 NOTE — Clinical Note
Khoajordan Miguel was seen and treated in our emergency department on 12/21/2022  Diagnosis: shoulder injury    Samantha Milligan  may return to work on return date  She may return on this date: 12/26/2022         If you have any questions or concerns, please don't hesitate to call        Brittni Cosme PA-C    ______________________________           _______________          _______________  Hospital Representative                              Date                                Time

## 2022-12-22 ENCOUNTER — OFFICE VISIT (OUTPATIENT)
Dept: OBGYN CLINIC | Facility: CLINIC | Age: 47
End: 2022-12-22

## 2022-12-22 VITALS
SYSTOLIC BLOOD PRESSURE: 169 MMHG | HEART RATE: 85 BPM | DIASTOLIC BLOOD PRESSURE: 107 MMHG | OXYGEN SATURATION: 98 % | BODY MASS INDEX: 39.47 KG/M2 | WEIGHT: 231.2 LBS | HEIGHT: 64 IN

## 2022-12-22 DIAGNOSIS — R29.898 SHOULDER CLICKING: ICD-10-CM

## 2022-12-22 DIAGNOSIS — M25.511 ACUTE PAIN OF RIGHT SHOULDER: Primary | ICD-10-CM

## 2022-12-22 NOTE — PATIENT INSTRUCTIONS
Ice Pack Application   WHAT YOU NEED TO KNOW:   Ice can be used to decrease swelling and pain after an injury or surgery  Common injuries that may benefit from ice therapy are sprains, strains, and bruises  The use of ice is most effective in the first 1 to 3 days after an injury  DISCHARGE INSTRUCTIONS:   How to apply ice:   Fill a bag with crushed ice about half full  Remove the air from the bag before you close it  You can also use a bag of frozen vegetables  Wrap the ice pack in a cloth to protect your skin from frostbite or other injury  Put the ice over the injured area for 20 to 30 minutes or as long as directed  Check your skin after about 30 seconds for color changes or blistering  Remove the ice if you notice skin changes or you feel burning or numbness in the area  Throw the ice pack away after use  Apply ice to your injured area 4 times each day or as directed  Ask your healthcare provider how many days you should apply ice  Contact your healthcare provider if:   You see blisters, whitening of your skin, or a bluish color to your skin after using ice  You feel burning or numbness when using ice  You have questions about the use of ice packs  © 2017 [facility] Information is for End User's use only and may not be sold, redistributed or otherwise used for commercial purposes  All illustrations and images included in CareNotes® are the copyrighted property of [employer]  or Bill Black  The above information is an  only  It is not intended as medical advice for individual conditions or treatments  Talk to your doctor, nurse or pharmacist before following any medical regimen to see if it is safe and effective for you  Safe Use of NSAIDs   WHAT YOU NEED TO KNOW:   NSAIDs are medicines that are used to decrease pain, swelling, and fever  NSAIDs are available with or without a doctor's order   NSAIDs that you can buy without a doctor's order include aspirin, ibuprofen, and naproxen  DISCHARGE INSTRUCTIONS:   Return to the emergency department if:   You have swelling around your mouth or trouble breathing  You are breathing fast or you have a fast heartbeat  You have nausea, vomiting, or abdominal pain  You have blood in your vomit or bowel movements  You have a seizure  Contact your healthcare provider if:   You have a headache or become confused  You develop hearing loss or ringing in your ears  You develop itching, a rash, or hives  You have swelling around your lower legs, feet, ankles, and hands  You do not know how much NSAIDs to give to your child  You have questions or concerns about your condition or care  How to give NSAIDs to your child safely:   Read the directions on the label  Find out if the medicine is right for your child's age and how much to give to your child  The dose for your child's weight or age should be listed  Do not  give your child more than the recommended amount  Use the measuring tool that came with the medicine  Do not  use another measuring tool, such as a kitchen spoon  Other measuring tools do not provide the right amount of medicine  How to take NSAIDs safely:   Read the directions on the label to learn how much medicine you should take and often to take it  Do not take more than the recommended amount  Talk to your healthcare provider if you need take NSAIDs for more than 30 days  The longer you take NSAIDs, the higher your risk of side effects will be  You may need to take other medicines to decrease your risk of side effects such as stomach bleeding  Do not take an over-the-counter NSAIDs with prescription NSAIDs  The combined amount of NSAIDs may be too high  Tell your healthcare provider about other medicines you take  Some medicines can increase the risk of side effects from NSAIDs   Your healthcare provider will tell you if it is okay to take NSAIDs and how to take them  Who should not take NSAIDs:  Certain people should avoid or limit NSAIDs  Do not  give NSAIDs to children under 10months of age without direction from your child's doctor  Do not give aspirin to children under 25years of age  Your child could develop Reye syndrome if he takes aspirin  Reye syndrome can cause life-threatening brain and liver damage  Check your child's medicine labels for aspirin, salicylates, or oil of wintergreen  Talk to your healthcare provider before you take NSAIDs if any of the following apply to you: You have reflux disease, a peptic ulcer, H pylori infection, or bleeding in your stomach or intestines  You have a bleeding disorder, or you take blood-thinning medicine  You are allergic to aspirin or other NSAIDs  You have liver or kidney or disease  You have high blood pressure or heart disease  You have 3 or more alcoholic drinks each day  You are pregnant  What you need to know about an NSAID overdose:  Certain health problems can occur if you take too much NSAID medicine at one time or over time  Problems include nausea, vomiting, and abdominal pain  You may develop gastritis, peptic ulcers, and stomach bleeding  You may also develop fluid retention, heart problems, and kidney problems  NSAIDs can worsen high blood pressure  You may become confused, or you may have a headache, hearing loss, or hallucinations  An overdose of aspirin may also cause rapid breathing, a rapid heartbeat, or seizures  What to do if you think you or your child took too much NSAID medicine:  Call the [facility] at [phone] immediately  © 2017 [address]  Information is for End User's use only and may not be sold, redistributed or otherwise used for commercial purposes  All illustrations and images included in CareNotes® are the copyrighted property of A D A M [employer] Inc  or Bill Black    The above information is an  only  It is not intended as medical advice for individual conditions or treatments  Talk to your doctor, nurse or pharmacist before following any medical regimen to see if it is safe and effective for you

## 2022-12-22 NOTE — PROGRESS NOTES
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  She lifted a heavy tray and felt a pop in the shoulder  Since then, she has been getting pain and clicking sensations when she moves her shoulder  Xrays in the ED were negative for fracture  The pain radiates down the lateral shoulder  No tingling  No neck symptoms        The following portions of the patient's history were reviewed and updated as appropriate: allergies, current medications, past family history, past medical history, past social history, past surgical history and problem list     Review of Systems  Ortho Exam  Past Medical History:   Diagnosis Date   • Fibroid    • Varicella      Past Surgical History:   Procedure Laterality Date   • CARDIAC ELECTROPHYSIOLOGY STUDY AND ABLATION      7/2015   • PARTIAL HYSTERECTOMY      2/2012   • US GUIDED THYROID BIOPSY  2/22/2022     Family History   Problem Relation Age of Onset   • No Known Problems Mother    • Heart disease Father    • Diabetes Father    • Hyperlipidemia Father    • Hypertension Father    • No Known Problems Sister    • No Known Problems Daughter    • No Known Problems Maternal Grandmother    • No Known Problems Maternal Grandfather    • No Known Problems Paternal Grandmother    • No Known Problems Paternal Grandfather    • No Known Problems Maternal Aunt    • No Known Problems Paternal Aunt    • Stomach cancer Paternal Aunt      Social History     Occupational History   • Not on file   Tobacco Use   • Smoking status: Never   • Smokeless tobacco: Never   Vaping Use   • Vaping Use: Never used   Substance and Sexual Activity   • Alcohol use: Yes     Comment: social   • Drug use: Never   • Sexual activity: Yes     Partners: Male       Review of Systems   Constitutional: Negative  HENT: Negative  Eyes: Negative  Respiratory: Negative  Cardiovascular: Negative  Gastrointestinal: Negative  Endocrine: Negative  Genitourinary: Negative  Musculoskeletal: As below      Allergic/Immunologic: Negative  Neurological: Negative  Hematological: Negative  Psychiatric/Behavioral: Negative  Objective   Physical Exam      I have personally reviewed pertinent films in PACS and my interpretation is no acute displaced fracture      · Constitutional: Awake, Alert, Oriented  · Eyes: EOMI  · Psych: Mood and affect appropriate  · Heart: regular rate   · Lungs: No audible wheezing  · Abdomen: No guarding  · Lymph: no lymphedema      • right Shoulder:  - Appearance  • No swelling, discoloration, deformity, or ecchymosis  - Palpation  • No tenderness to palpation of the clavicle, AC joint, biceps tendon, scapula, or proximal humerus  • Palpable clicking with active abduction  - ROM  • Flexion: 140, ER: 80 and IR: L5  - Motor  • Internal and external rotation 5/5 with shoulder at side, flexion 5/5  - Special tests  • Empty Can Test negative, Drop Arm Test negative, Hawkin's Impingement Test positive and Sutter Test positive  - NVI distally

## 2022-12-22 NOTE — DISCHARGE INSTRUCTIONS
Tylenol and motrin for pain  Ice to the area: 20 min on / 20 min off  Follow up with orthopedist  Return to the ER if anything acutely changes

## 2022-12-22 NOTE — ED PROVIDER NOTES
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 Taking?   diazepam (VALIUM) 5 mg tablet   No No   Sig: Take 1 tablet (5 mg total) by mouth every 8 (eight) hours as needed for muscle spasms for up to 15 doses   lidocaine (Lidoderm) 5 %   No No   Sig: Apply 1 patch topically daily for 5 days Remove & Discard patch within 12 hours or as directed by MD      Facility-Administered Medications: None       Past Medical History:   Diagnosis Date   • Fibroid    • Varicella        Past Surgical History:   Procedure Laterality Date   • CARDIAC ELECTROPHYSIOLOGY STUDY AND ABLATION      7/2015   • PARTIAL HYSTERECTOMY      2/2012   • US GUIDED THYROID BIOPSY  2/22/2022       Family History   Problem Relation Age of Onset   • No Known Problems Mother    • Heart disease Father    • Diabetes Father    • Hyperlipidemia Father    • Hypertension Father    • No Known Problems Sister    • No Known Problems Daughter    • No Known Problems Maternal Grandmother    • No Known Problems Maternal Grandfather    • No Known Problems Paternal Grandmother    • No Known Problems Paternal Grandfather    • No Known Problems Maternal Aunt    • No Known Problems Paternal Aunt    • Stomach cancer Paternal Aunt      I have reviewed and agree with the history as documented      E-Cigarette/Vaping   • E-Cigarette Use Never User      E-Cigarette/Vaping Substances   • Nicotine No    • THC No    • CBD No    • Flavoring No      Social History     Tobacco Use   • Smoking status: Never   • Smokeless tobacco: Never   Vaping Use   • Vaping Use: Never used   Substance Use Topics   • Alcohol use: Yes     Comment: social   • Drug use: Never       Review of Systems   Constitutional: Negative for chills and fever  HENT: Negative for ear pain and sore throat  Eyes: Negative for pain and visual disturbance  Respiratory: Negative for cough and shortness of breath  Cardiovascular: Negative for chest pain and palpitations  Gastrointestinal: Negative for abdominal pain and vomiting  Genitourinary: Negative for dysuria and hematuria  Musculoskeletal: Positive for arthralgias  Negative for back pain  Skin: Negative for color change and rash  Neurological: Negative for seizures and syncope  All other systems reviewed and are negative  Physical Exam  Physical Exam  Vitals and nursing note reviewed  Constitutional:       General: She is not in acute distress  Appearance: She is well-developed  HENT:      Head: Normocephalic and atraumatic  Eyes:      Conjunctiva/sclera: Conjunctivae normal    Cardiovascular:      Rate and Rhythm: Normal rate and regular rhythm  Heart sounds: No murmur heard  Pulmonary:      Effort: Pulmonary effort is normal  No respiratory distress  Breath sounds: Normal breath sounds  Abdominal:      Palpations: Abdomen is soft  Tenderness: There is no abdominal tenderness  Musculoskeletal:         General: Tenderness (Minimal tenderness noted to [facility] joint) present  No swelling  Normal range of motion  Cervical back: Neck supple  Comments: Patient has full ROM in right shoulder  5/5 strength   5/5 sensation  Intact pulses  Intact  strength    Skin:     General: Skin is warm and dry  Capillary Refill: Capillary refill takes less than 2 seconds     Neurological: Mental Status: She is alert  Psychiatric:         Mood and Affect: Mood normal          Vital Signs  ED Triage Vitals [12/21/22 1925]   Temperature Pulse Respirations Blood Pressure SpO2   98 1 °F (36 7 °C) 64 16 (!) 179/107 97 %      Temp Source Heart Rate Source Patient Position - Orthostatic VS BP Location FiO2 (%)   Oral -- -- -- --      Pain Score       7           Vitals:    12/21/22 1925 12/21/22 2030   BP: (!) 179/107 (!) 182/105   Pulse: 64 59       ED Medications  Medications   ibuprofen (MOTRIN) tablet 400 mg (400 mg Oral Given 12/21/22 2036)       Diagnostic Studies  Results Reviewed     None                 XR shoulder 2+ views RIGHT   ED Interpretation by Carter Romano PA-C (12/21 2020)   No acute findings                    MDM  Number of Diagnoses or Management Options  Acute pain of right shoulder: new and requires workup  Injury of right shoulder, initial encounter: new and requires workup  Diagnosis management comments: Patient with right shoulder pain s/p lifting something heavy at work  Denies other symptoms  FROM in shoulder  Has dull ache in shoulder  parasthesias radiating down  Exam unremarkable  Xray normal  Suspect rotator cuff injury vs nerve impingement  Patient placed in sling  Orthopedist information given  Advised to return to the ER if anything acutely changes         Amount and/or Complexity of Data Reviewed  Tests in the radiology section of CPT®: ordered and reviewed        Disposition  Final diagnoses:   Injury of right shoulder, initial encounter   Acute pain of right shoulder     Time reflects when diagnosis was documented in both MDM as applicable and the Disposition within this note     Time User Action Codes Description Comment    12/21/2022  8:44 PM Anil, [address] [facility] Injury of right shoulder, initial encounter     12/21/2022  8:44 PM Silvina Lamb Add [M29 [id number]] Acute pain of right shoulder       ED Disposition     ED Disposition   Discharge Condition   Stable    Date/Time   Wed Dec 21, 2022  9:25 PM    Comment   Onelia Sprain discharge to home/self care  Follow-up Information     Follow up With Specialties Details Why Contact Info Additional Information    [facility] Emergency Department Emergency Medicine  If symptoms worsen [address] [facility] [address] [facility] Emergency Department, [address], [place], [place], [address] [facility] Schedule an appointment as soon as possible for a visit   Adalberto Cotto [address] [facility] Emergency Room 85 [id number]  [facility] [address], [address], [place], [place], [address] [facility]    Michelle Quintana MD Orthopedic Surgery Schedule an appointment as soon as possible for a visit  Orthopedist Ally Marrero [facility]  [address] [facility] [phone]             Discharge Medication List as of 12/21/2022  9:25 PM      START taking these medications    Details   Diclofenac Sodium (VOLTAREN) 1 % Apply 2 g topically 4 (four) times a day For pain to affected area, Starting Wed 12/21/2022, Normal      naproxen (Naprosyn) 500 mg tablet Take 1 tablet (500 mg total) by mouth 2 (two) times a day with meals for 7 days, Starting Wed 12/21/2022, Until Wed 12/28/2022, Normal         CONTINUE these medications which have NOT CHANGED    Details   diazepam (VALIUM) 5 mg tablet Take 1 tablet (5 mg total) by mouth every 8 (eight) hours as needed for muscle spasms for up to 15 doses, Starting Tue 7/19/2022, Normal      lidocaine (Lidoderm) 5 % Apply 1 patch topically daily for 5 days Remove & Discard patch within 12 hours or as directed by MD, Starting Tue 7/19/2022, Until Sun 7/24/2022, Normal             No discharge procedures on file      PDMP Review     None          ED Provider  Electronically Signed by           Kameron Lovell PA-C  12/22/22 0026

## 2022-12-22 NOTE — LETTER
December 22, 2022     Patient: Sierra Garcia  YOB: 1975  Date of Visit: 12/22/2022      To Whom it May Concern:    Dorcas Roper is under my professional care  Evette Larkin was seen in my office on 12/22/2022  Evette Larkin may return to work with limitations : No overhead reaching  Max lifting 30lbs       If you have any questions or concerns, please don't hesitate to call           Sincerely,          Carlos Sommer PA-C        CC: No Recipients

## 2022-12-27 ENCOUNTER — TELEPHONE (OUTPATIENT)
Dept: OBGYN CLINIC | Facility: HOSPITAL | Age: 47
End: 2022-12-27

## 2022-12-27 NOTE — TELEPHONE ENCOUNTER
Caller: Jose C Piña    Doctor: Deyanira Cho // [facility]  Reason for call: Patient was seen at [place] office on 12/22 with Deyanira Cho and $25  Co-pay was charged  Patient then notified WC claim does not pay co pay  Patient was to have a $25 00 co pay returned to her card  As of today, 12/27 no return payment has been made to her card  Please return call and advise       Call back#: [phone]

## 2023-01-13 NOTE — NURSING NOTE
Call placed to patient to discuss upcoming appointment at [facility] radiology department and complete consultation with patient  Patient is having right shoulder MRI arthrogram utilizing fluoroscopy  guidance  Reviewed patient's allergies, no current anticoagulant medication present, also discussed the pre and post procedure expectations  Reminded patient of location and time expected for procedure, Patient expressed understanding by verbalizing and repeating instructions

## 2023-01-23 ENCOUNTER — HOSPITAL ENCOUNTER (OUTPATIENT)
Dept: RADIOLOGY | Facility: HOSPITAL | Age: 48
Discharge: HOME | End: 2023-01-23
Admitting: RADIOLOGY

## 2023-01-23 ENCOUNTER — HOSPITAL ENCOUNTER (OUTPATIENT)
Dept: RADIOLOGY | Facility: HOSPITAL | Age: 48
Discharge: HOME | End: 2023-01-23

## 2023-01-23 DIAGNOSIS — R29.898 SHOULDER CLICKING: ICD-10-CM

## 2023-01-23 DIAGNOSIS — M25.511 ACUTE PAIN OF RIGHT SHOULDER: ICD-10-CM

## 2023-01-23 RX ORDER — SODIUM CHLORIDE 9 MG/ML
100 INJECTION INTRAVENOUS
Status: COMPLETED | OUTPATIENT
Start: 2023-01-23 | End: 2023-01-23

## 2023-01-23 RX ORDER — LIDOCAINE HYDROCHLORIDE 10 MG/ML
10 INJECTION, SOLUTION EPIDURAL; INFILTRATION; INTRACAUDAL; PERINEURAL
Status: COMPLETED | OUTPATIENT
Start: 2023-01-23 | End: 2023-01-23

## 2023-01-23 RX ADMIN — SODIUM CHLORIDE 100 ML: 9 INJECTION, SOLUTION INTRAMUSCULAR; INTRAVENOUS; SUBCUTANEOUS at 13:42

## 2023-01-23 RX ADMIN — IOHEXOL 5 ML: 300 INJECTION, SOLUTION INTRAVENOUS at 13:40

## 2023-01-23 RX ADMIN — GADOBUTROL 2 ML: 604.72 INJECTION INTRAVENOUS at 13:40

## 2023-01-23 RX ADMIN — LIDOCAINE HYDROCHLORIDE 10 ML: 10 INJECTION, SOLUTION EPIDURAL; INFILTRATION; INTRACAUDAL; PERINEURAL at 13:42

## 2023-01-25 ENCOUNTER — TELEPHONE (OUTPATIENT)
Dept: OBGYN CLINIC | Facility: HOSPITAL | Age: 48
End: 2023-01-25

## 2023-01-25 NOTE — TELEPHONE ENCOUNTER
Caller: Lisa Espinal    Doctor: Shahzad Isaacs    Reason for call: patient had her MRI of her R Shoulder, she has her results and wants to know what she can and can't do    Please Advise    Call back#: [phone]

## 2023-01-26 NOTE — TELEPHONE ENCOUNTER
Caller: Patient     Doctor: Adali Rea    Reason for call: patient saw her results and would like to know what she can or cant be doing at the moment    Call back#: [phone]

## 2023-02-03 ENCOUNTER — TELEPHONE (OUTPATIENT)
Dept: OBGYN CLINIC | Facility: HOSPITAL | Age: 48
End: 2023-02-03

## 2023-02-14 ENCOUNTER — OFFICE VISIT (OUTPATIENT)
Dept: OBGYN CLINIC | Facility: OTHER | Age: 48
End: 2023-02-14

## 2023-02-14 VITALS — HEIGHT: 64 IN | WEIGHT: 222 LBS | BODY MASS INDEX: 37.9 KG/M2

## 2023-02-14 DIAGNOSIS — S46.811A TRAUMATIC TEAR OF SUPRASPINATUS TENDON OF RIGHT SHOULDER, INITIAL ENCOUNTER: Primary | ICD-10-CM

## 2023-02-14 NOTE — LETTER
February 14, 2023     Patient: Bharati Olivier  YOB: 1975  Date of Visit: 2/14/2023      To Whom it May Concern:    Keysha Regalado is under my professional care  Lisa Espinal was seen in my office on 2/14/2023  Lisa Espinal may return to work with the current work restrictions  She may follow up with occupational medicine regarding these current restrictions  Patient has elected to continue with non-operative treatment at this time  If she decides to pursue surgical intervention involving an arthroscopic rotator cuff repair, I will provide her with restrictions post-operatively  If you have any questions or concerns, please don't hesitate to call           Sincerely,          Rei De La Cruz MD

## 2023-02-14 NOTE — PROGRESS NOTES
Assessment  Diagnoses and all orders for this visit:    Traumatic tear of supraspinatus tendon of right shoulder, initial encounter        Discussion and Plan:  Rebeca Hussein is a 52 y o  female who presents with a traumatic rotator cuff tear sustained during work  · MRI of the right shoulder reviewed with the patient at length, the findings correlate with her examination and her clinical history of an acute rotator cuff tear  Mechanism she describes " lifting a heavy tray and feeling a pop" does correspond with these findings  · Reviewed non-operative treatments include: physical therapy, OTC analgesics, activity modification  · Discussed due to the patient's age, good health, and high activity level, I would recommend surgical intervention involving an arthroscopic rotator cuff repair  Risk of the surgery are inclusive of but not limited to bleeding, infection, nerve injury, blood clot, worsening of symptoms, not achieving the anticipated results, persistent stiffness, weakness and the need for additional surgery  The patient verbally stated they understood those risks  · Post operative recovery reviewed with the patient today  · Risks and benefits of all treatment options reviewed at length  Patient would like to proceed with non-operative treatment at this time  She does state that she is unable to take the time off of work that surgical intervention would require, we did detail the recovery and did let her know that I would provide her with a reasonable restriction to allow her to return to work as soon as possible obviously no modified duty as she recovers from this and that she can discuss this with her employer to see if they will accommodate a sedentary duty activity for 6 weeks following the surgery and then a graduated return  · In the meantime, a physical therapy script provided to the patient today     · Referral to occupational medicine placed to manage work restrictions unless the patient wishes me to consider surgical treatment of her shoulder, if that is the case I would be happy to provide postoperative restrictions to guide her in her recovery    There are symptoms present that may be consistent with a cervical spine injury (numbness and tingling)  Further evaluation may be warranted for those symptoms but I would defer that to the occupational medicine doctors    Subjective: right shoulder pain  Patient ID: Jamey Nieto is a 52 y o  female      Patient is RHD and presents today for intal evaluation of her right shoulder  This is a worker's comp case  She injured the 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  She was lifting a heavy tray, when she felt a "pop" and immense pain in the right shoulder  She previously saw Vilma Delcid PA-C with [facility] for evaluation and treatment  She was prescribed naproxen, which helps alleviate her pain  She notes should pain with increased strenuous activity such as lifting  She reports no improvement in her symptoms since the injury  Patient also reports intermittent numbness and tingling in the right upper extremity since the injury             The following portions of the patient's history were reviewed and updated as appropriate: allergies, current medications, past family history, past medical history, past social history, past surgical history and problem list     Review of Systems   Constitutional: Positive for activity change  Negative for chills, fever and unexpected weight change  HENT: Negative for hearing loss, nosebleeds and sore throat  Eyes: Negative for pain, redness and visual disturbance  Respiratory: Negative for cough, shortness of breath and wheezing  Cardiovascular: Negative for chest pain, palpitations and leg swelling  Gastrointestinal: Negative for abdominal pain, nausea and vomiting  Endocrine: Negative for polydipsia and polyuria  Genitourinary: Negative for dysuria and hematuria  Musculoskeletal: Positive for myalgias  See HPI   Skin: Negative for rash and wound  Neurological: Negative for dizziness, numbness and headaches  Psychiatric/Behavioral: Negative for decreased concentration and suicidal ideas  The patient is not nervous/anxious  Objective:  Ht 5' 4" (1 626 m)   Wt 101 kg (222 lb)   BMI 38 11 kg/m²       Right Shoulder Exam     Range of Motion   The patient has normal right shoulder ROM  Active abduction: 170   External rotation: 70   Forward flexion: 180   Internal rotation 0 degrees: Mid thoracic     Muscle Strength   The patient has normal right shoulder strength  Abduction: 5/5   Internal rotation: 5/5   External rotation: 5/5   Supraspinatus: 5/5   Subscapularis: 5/5   Biceps: 5/5     Tests   Saul test: positive    Other   Erythema: absent  Scars: absent  Sensation: normal  Pulse: present            Physical Exam  Vitals and nursing note reviewed  Constitutional:       Appearance: She is well-developed  HENT:      Head: Normocephalic and atraumatic  Right Ear: External ear normal       Left Ear: External ear normal    Eyes:      General: No scleral icterus  Extraocular Movements: Extraocular movements intact  Conjunctiva/sclera: Conjunctivae normal    Cardiovascular:      Rate and Rhythm: Normal rate  Pulmonary:      Effort: Pulmonary effort is normal  No respiratory distress  Musculoskeletal:      Cervical back: Normal range of motion and neck supple  Comments: See Ortho exam   Skin:     General: Skin is warm and dry  Neurological:      Mental Status: She is alert and oriented to person, place, and time  Psychiatric:         Behavior: Behavior normal            I have personally reviewed pertinent films in PACS and my interpretation is as follows  MRI of the right shoulder taken on 1/23/23 demonstrates a full-thickness anterior leading edge supraspinatus tendon tear exhibiting retraction to the acromion process  There is no atrophy of the supraspinatus muscle belly consistent with an acute injury  Inexplicably the radiologist comments on superior humeral migration showing chronicity, this is an inaccurate statement and superior humeral migration on the supine MRI is normal and is not consistent with chronicity      Plain radiographs of the right shoulder 12/21/22 show no proximal humeral migration and no degenerative change    Scribe Attestation    I,:  Regis Hobbs am acting as a scribe while in the presence of the attending physician :       I,:  Lorena Cheng MD personally performed the services described in this documentation    as scribed in my presence :

## 2023-03-07 ENCOUNTER — TELEPHONE (OUTPATIENT)
Dept: OBGYN CLINIC | Facility: OTHER | Age: 48
End: 2023-03-07

## 2023-03-07 NOTE — TELEPHONE ENCOUNTER
Caller: patient    Doctor: Nahid Wong    Reason for call: patient returned call, said she doesn't need a FU with Dr Nahid Wong, she will start with PT and cb with any issues   Gave her # for [facility] PT    Call back#:

## 2024-06-26 ENCOUNTER — ANNUAL EXAM (OUTPATIENT)
Dept: GYNECOLOGY | Facility: CLINIC | Age: 49
End: 2024-06-26

## 2024-06-26 VITALS
WEIGHT: 221.6 LBS | BODY MASS INDEX: 37.83 KG/M2 | SYSTOLIC BLOOD PRESSURE: 140 MMHG | DIASTOLIC BLOOD PRESSURE: 82 MMHG | HEIGHT: 64 IN

## 2024-06-26 DIAGNOSIS — Z01.419 WOMEN'S ANNUAL ROUTINE GYNECOLOGICAL EXAMINATION: Primary | ICD-10-CM

## 2024-06-26 DIAGNOSIS — Z90.711 STATUS POST ABDOMINAL SUPRACERVICAL SUBTOTAL HYSTERECTOMY: ICD-10-CM

## 2024-06-26 DIAGNOSIS — Z12.31 ENCOUNTER FOR SCREENING MAMMOGRAM FOR MALIGNANT NEOPLASM OF BREAST: ICD-10-CM

## 2024-06-26 DIAGNOSIS — N64.4 BREAST PAIN, RIGHT: ICD-10-CM

## 2024-06-26 DIAGNOSIS — D24.1 FIBROADENOMA OF RIGHT BREAST: ICD-10-CM

## 2024-06-26 PROCEDURE — 99386 PREV VISIT NEW AGE 40-64: CPT | Performed by: OBSTETRICS & GYNECOLOGY

## 2024-06-26 NOTE — PROGRESS NOTES
"Assessment   Annual well woman exam  Asymptomatic menopausal state  Status post abdominal supracervical hysterectomy for uterine fibroids  Right breast pain  History of fibroadenoma and right breast  No new GYN concerns or complaints  Colon cancer screening, no family history of colon cancer        Plan   Follow-up diagnostic mammogram right breast with ultrasound  Routine screening left breast mammogram  Cologuard testing ordered, patient has no insurance  Normal Pap in  next Pap due in    All questions answered.  Breast self exam technique reviewed and patient encouraged to perform self-exam monthly.  Discussed healthy lifestyle modifications.     Subjective here for annual exam     Radha Kwan is a 48 y.o. female who presents for annual exam.  Asymptomatic menopausal state status post supracervical abdominal hysterectomy, patient no longer has menstrual cycles.  Denies vaginal bleeding, spotting, or discharge.  She is sexually active.  The patient reports that there is not domestic violence in her life.     Current contraception: status post hysterectomy  History of abnormal Pap smear: no  Family history of uterine or ovarian cancer: no  Regular self breast exam: yes  History of abnormal mammogram: yes -fibroadenoma right breast  Family history of breast cancer: no  History of abnormal lipids: no  Menstrual History:  OB History          5    Para   2    Term   2            AB        Living   2         SAB        IAB        Ectopic        Multiple        Live Births                    Menarche age: 12  No LMP recorded. Patient has had a hysterectomy.     Review of Systems  Pertinent items are noted in HPI.      Objective no acute distress   /82 (BP Location: Left arm, Patient Position: Sitting, Cuff Size: Large)   Ht 5' 4\" (1.626 m)   Wt 101 kg (221 lb 9.6 oz)   BMI 38.04 kg/m²     General:   alert and oriented, in no acute distress, alert, appears stated age, and cooperative "   Heart: regular rate and rhythm, S1, S2 normal, no murmur, click, rub or gallop   Lungs: clear to auscultation bilaterally   Abdomen: soft, non-tender, without masses or organomegaly   Vulva: normal, Bartholin's, Urethra, Lake's normal, female escutcheon   Vagina: normal mucosa, normal discharge, no palpable nodules   Cervix: anteverted, multiparous appearance, no cervical motion tenderness, and no lesions   Uterus: surgically absent   Adnexa: normal adnexa and no mass, fullness, tenderness   Bilateral breast exam in the sitting and supine position with chaperone present, no visible or palpable breast lesions identified.  No breast masses noted.    No supraclavicular or axillary lymphadenopathy noted.  No nipple discharge.   Reviewed self-breast exam techniques.   Rectal exam,  deferred